# Patient Record
Sex: MALE | Race: BLACK OR AFRICAN AMERICAN | NOT HISPANIC OR LATINO | URBAN - METROPOLITAN AREA
[De-identification: names, ages, dates, MRNs, and addresses within clinical notes are randomized per-mention and may not be internally consistent; named-entity substitution may affect disease eponyms.]

---

## 2023-03-01 LAB — THYROTROPIN (MIU/L) IN SER/PLAS BY DETECTION LIMIT <= 0.05 MIU/L: 0.82 MIU/L (ref 0.44–3.98)

## 2023-03-06 LAB — HELICOBACTER PYLORI AG: POSITIVE

## 2023-03-07 ENCOUNTER — TELEPHONE (OUTPATIENT)
Dept: PRIMARY CARE | Facility: CLINIC | Age: 38
End: 2023-03-07

## 2023-03-07 NOTE — TELEPHONE ENCOUNTER
Attempted to call patient on the number to let him know about his H pylori. Left a VM. Awaitng a call back

## 2023-03-09 DIAGNOSIS — A04.8 HELICOBACTER PYLORI (H. PYLORI) INFECTION: Primary | ICD-10-CM

## 2023-03-10 DIAGNOSIS — A04.8 HELICOBACTER PYLORI (H. PYLORI) INFECTION: Primary | ICD-10-CM

## 2023-03-10 RX ORDER — OMEPRAZOLE 20 MG/1
20 CAPSULE, DELAYED RELEASE ORAL
Qty: 28 CAPSULE | Refills: 0 | Status: SHIPPED | OUTPATIENT
Start: 2023-03-10 | End: 2024-02-26 | Stop reason: WASHOUT

## 2023-03-10 RX ORDER — CLARITHROMYCIN 500 MG/1
500 TABLET, FILM COATED ORAL 2 TIMES DAILY
Qty: 28 TABLET | Refills: 0 | Status: SHIPPED | OUTPATIENT
Start: 2023-03-10 | End: 2023-03-24

## 2023-03-10 RX ORDER — AMOXICILLIN 500 MG/1
1000 CAPSULE ORAL EVERY 12 HOURS SCHEDULED
Qty: 56 CAPSULE | Refills: 0 | Status: SHIPPED | OUTPATIENT
Start: 2023-03-10 | End: 2023-03-24

## 2024-01-23 ENCOUNTER — OFFICE VISIT (OUTPATIENT)
Dept: PRIMARY CARE | Facility: CLINIC | Age: 39
End: 2024-01-23
Payer: COMMERCIAL

## 2024-01-23 ENCOUNTER — APPOINTMENT (OUTPATIENT)
Dept: PRIMARY CARE | Facility: CLINIC | Age: 39
End: 2024-01-23
Payer: COMMERCIAL

## 2024-01-23 ENCOUNTER — LAB (OUTPATIENT)
Dept: LAB | Facility: LAB | Age: 39
End: 2024-01-23
Payer: COMMERCIAL

## 2024-01-23 VITALS
DIASTOLIC BLOOD PRESSURE: 86 MMHG | TEMPERATURE: 98 F | HEIGHT: 70 IN | BODY MASS INDEX: 22.82 KG/M2 | HEART RATE: 60 BPM | SYSTOLIC BLOOD PRESSURE: 129 MMHG | OXYGEN SATURATION: 98 % | RESPIRATION RATE: 16 BRPM

## 2024-01-23 DIAGNOSIS — A04.8 HELICOBACTER PYLORI STOOL TEST POSITIVE: Primary | ICD-10-CM

## 2024-01-23 DIAGNOSIS — A04.8 HELICOBACTER PYLORI STOOL TEST POSITIVE: ICD-10-CM

## 2024-01-23 DIAGNOSIS — Z57.39 OCCUPATIONAL EXPOSURE TO OTHER AIR CONTAMINANTS: ICD-10-CM

## 2024-01-23 DIAGNOSIS — L91.8 SKIN TAG: ICD-10-CM

## 2024-01-23 PROCEDURE — 99213 OFFICE O/P EST LOW 20 MIN: CPT | Performed by: STUDENT IN AN ORGANIZED HEALTH CARE EDUCATION/TRAINING PROGRAM

## 2024-01-23 PROCEDURE — 83013 H PYLORI (C-13) BREATH: CPT

## 2024-01-23 NOTE — PROGRESS NOTES
"Subjective   Patient ID: Gavino Barba is a 38 y.o. male who presents for Follow-up.  HPI  Patient is here for a follow-up.  Reports he has completed the course of pylori antibiotic pack.  Is here for test for cure.  He reports increasing chest tightness when he was at its previous job.  Now that he has been out of his job he feels much better.  But he wonders if there was any damage done to his lungs or any particles settled down in his lungs due to his previous job.  He reports one of his other coworkers also experienced similar symptoms.  Likely denies any shortness of breath or chest pain or palpitations.  Past Surgical History:   Procedure Laterality Date    OTHER SURGICAL HISTORY  06/14/2014    Open Treatment Of Fractures Of Both Radial And Ulnar Shafts      No family history on file.   Not on File       Occupation:     Review of Systems   Constitutional:  Negative for activity change and fever.   HENT:  Negative for congestion.    Respiratory:  Negative for cough, shortness of breath and wheezing.    Cardiovascular:  Negative for chest pain and leg swelling.   Gastrointestinal:  Negative for abdominal pain, constipation, nausea and vomiting.   Endocrine: Negative for cold intolerance.   Genitourinary:  Negative for dysuria, hematuria and urgency.   Neurological:  Negative for dizziness, speech difficulty, weakness and numbness.   Psychiatric/Behavioral:  Negative for self-injury and suicidal ideas.        Objective   Visit Vitals  /86   Pulse 60   Temp 36.7 °C (98 °F)   Resp 16   Ht 1.778 m (5' 10\")   SpO2 98%   BMI 22.82 kg/m²   BSA 1.89 m²      Physical Exam  Constitutional:       Appearance: Normal appearance.   HENT:      Head: Normocephalic and atraumatic.      Nose: Nose normal.      Mouth/Throat:      Mouth: Mucous membranes are moist.   Eyes:      Conjunctiva/sclera: Conjunctivae normal.      Pupils: Pupils are equal, round, and reactive to light.   Cardiovascular:      Rate and Rhythm: Normal " rate and regular rhythm.      Pulses: Normal pulses.      Heart sounds: Normal heart sounds.   Pulmonary:      Effort: Pulmonary effort is normal.      Breath sounds: Normal breath sounds. No wheezing.   Musculoskeletal:         General: Normal range of motion.      Cervical back: Neck supple.   Skin:     General: Skin is warm.   Neurological:      General: No focal deficit present.      Mental Status: He is alert and oriented to person, place, and time.   Psychiatric:         Mood and Affect: Mood normal.         Behavior: Behavior normal.         Thought Content: Thought content normal.         Judgment: Judgment normal.         Assessment/Plan   Diagnoses and all orders for this visit:  Helicobacter pylori stool test positive  No symptoms at present.  Completed course of antibiotics.  Agreeable for retesting-     H. Pylori Breath Test; Future  Occupational exposure to other air contaminants  No symptoms at present.  It is comfortable, breathing comfortably at room air saturating at 98%.  We discussed various options including testing, being seen by pulmonologist.  Referred to see pulmonology. referral placed-     Referral to Pulmonology; Future  Skin tag  -     Referral to Dermatology

## 2024-01-24 LAB — UREA BREATH TEST QL: NEGATIVE

## 2024-02-08 ENCOUNTER — APPOINTMENT (OUTPATIENT)
Dept: PRIMARY CARE | Facility: CLINIC | Age: 39
End: 2024-02-08
Payer: COMMERCIAL

## 2024-02-15 ASSESSMENT — ENCOUNTER SYMPTOMS
WHEEZING: 0
DIZZINESS: 0
COUGH: 0
DYSURIA: 0
ABDOMINAL PAIN: 0
WEAKNESS: 0
ACTIVITY CHANGE: 0
VOMITING: 0
NUMBNESS: 0
SPEECH DIFFICULTY: 0
NAUSEA: 0
HEMATURIA: 0
SHORTNESS OF BREATH: 0
FEVER: 0
CONSTIPATION: 0

## 2024-02-26 ENCOUNTER — OFFICE VISIT (OUTPATIENT)
Dept: PULMONOLOGY | Facility: HOSPITAL | Age: 39
End: 2024-02-26
Payer: COMMERCIAL

## 2024-02-26 VITALS
BODY MASS INDEX: 23.82 KG/M2 | OXYGEN SATURATION: 98 % | WEIGHT: 166 LBS | HEART RATE: 68 BPM | DIASTOLIC BLOOD PRESSURE: 89 MMHG | SYSTOLIC BLOOD PRESSURE: 136 MMHG | TEMPERATURE: 98.1 F

## 2024-02-26 DIAGNOSIS — Z57.39 OCCUPATIONAL EXPOSURE TO OTHER AIR CONTAMINANTS: Primary | ICD-10-CM

## 2024-02-26 PROCEDURE — 1036F TOBACCO NON-USER: CPT | Performed by: NURSE PRACTITIONER

## 2024-02-26 PROCEDURE — 99214 OFFICE O/P EST MOD 30 MIN: CPT | Performed by: NURSE PRACTITIONER

## 2024-02-26 PROCEDURE — 99204 OFFICE O/P NEW MOD 45 MIN: CPT | Performed by: NURSE PRACTITIONER

## 2024-02-26 SDOH — ECONOMIC STABILITY: FOOD INSECURITY: WITHIN THE PAST 12 MONTHS, YOU WORRIED THAT YOUR FOOD WOULD RUN OUT BEFORE YOU GOT MONEY TO BUY MORE.: NEVER TRUE

## 2024-02-26 SDOH — ECONOMIC STABILITY: FOOD INSECURITY: WITHIN THE PAST 12 MONTHS, THE FOOD YOU BOUGHT JUST DIDN'T LAST AND YOU DIDN'T HAVE MONEY TO GET MORE.: NEVER TRUE

## 2024-02-26 ASSESSMENT — PATIENT HEALTH QUESTIONNAIRE - PHQ9
2. FEELING DOWN, DEPRESSED OR HOPELESS: NOT AT ALL
SUM OF ALL RESPONSES TO PHQ9 QUESTIONS 1 & 2: 0
1. LITTLE INTEREST OR PLEASURE IN DOING THINGS: NOT AT ALL

## 2024-02-26 ASSESSMENT — LIFESTYLE VARIABLES: HOW OFTEN DO YOU HAVE A DRINK CONTAINING ALCOHOL: MONTHLY OR LESS

## 2024-02-26 ASSESSMENT — PAIN SCALES - GENERAL: PAINLEVEL: 0-NO PAIN

## 2024-02-26 NOTE — PATIENT INSTRUCTIONS
Occupational exposure: having chest tightness and palpitations - has resolved since he stopped working there. CXR 2/2023 - normal   - will get repeat CXR   - will get baseline PFTs     Thank you for visiting the Pulmonary clinic today!   Return to clinic -- schedule follow up with Dr. Barker [phone]  - choose option for office - after CXR and PFTs   Herlinda Cottrell CNP  My office -  [phone]- Gabrielle is my  and Thais is my nurse.   Radiology scheduling [phone]   Appointment scheduling [phone]   Pulmonary function testing - [phone]    
detailed exam

## 2024-02-26 NOTE — PROGRESS NOTES
Patient: Gavino Barba    [id number]  : 1985 -- AGE 38 y.o.    Provider: ROBIN Guzman-CNP     Location [facility]   Service Date: 2024              [facility]  New Visit Note      HISTORY OF PRESENT ILLNESS     The patient's referring provider is: Beatriz Stern*    HISTORY OF PRESENT ILLNESS   Gavino Barba is a 38 y.o. male who presents to a [facility] for an evaluation with concerns of Pt. here today for NPV. I have independently interviewed and examined the patient in the office and reviewed available records.    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 He was having chest tightness - mild, but noticeable. He states he had no SOB with this. He was having episodes of palpitations/ skipped beats. He went to ED a few times concernign for a heart attack. He states he went back and forth several times. He was told he had H pylori - treated with 2 weeks of antiboitocs and was still having issues. He ended up leaving that job - after 3-4 months after leaving that job symptoms resolved. He had spoke with his old boss who is now at his new job - concern for ? PCBs from old transformer wiring. States there was fiberglass exposure as well. He states he would have an N95, but many times it was too hot and he had difficulty wearing it. He currently denies any symptoms - cough, wheezing, MARTE, allergies, GERD, or CP. He is just concerned regarding exposures and wants to make sure there is nothing additional he should do.      Previous pulmonary history: He has no history of recurrent infections, or lung disease as a child.  He had no previous lung hx, never on oxygen or inhaler therapy.     Inhalers/nebulized medications: none     Hospitalization History: He has not been hospitalized  over the last year for breathing related problem.    Sleep history: He has been told he snores. Wife has told him. He states he does not wake up feeling rested, but works a lot and has kids.       ALLERGIES AND MEDICATIONS     ALLERGIES  No Known Allergies    MEDICATIONS  Current Outpatient Medications   Medication Sig Dispense Refill    omeprazole (PriLOSEC) 20 mg DR capsule Take 1 capsule (20 mg) by mouth in the morning and 1 capsule (20 mg) in the evening. Take before meals. Do all this for 14 days. Do not crush or chew.. 28 capsule 0    omeprazole-clarith-amoxicillin 20 mg-500 mg- 500 mg (40) combo pack Take Omeprazole 20 mg (one capsule), clarithromycin 500 mg (one tablet), and amoxicillin 1,000 mg (two capsules) administered together twice daily for 14 days orally (Patient not taking: Reported on 2/26/2024) 1 each 0     No current facility-administered medications for this visit.         PAST HISTORY     PAST MEDICAL HISTORY  - H pylori s/p antibiotics   - left arm injury with surgery x2   - two ACL surgeries     PAST SURGICAL HISTORY  Past Surgical History:   Procedure Laterality Date    OTHER SURGICAL HISTORY  06/14/2014    Open Treatment Of Fractures Of Both Radial And Ulnar Shafts       IMMUNIZATION HISTORY    There is no immunization history on file for this patient.    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     FAMILY HISTORY  Asthma - mom, uncle, aunts     RESULTS/DATA     Pulmonary Function Test Results     None on record     Chest Radiograph     CHEST 2 VIEW   Impression  1. No evidence of acute cardiopulmonary process.      Chest CT Scan     None on record        Echocardiogram     None on record       Other testing/ Labs      REVIEW OF SYSTEMS     REVIEW OF SYSTEMS  Review of Systems    Constitutional:  Negative for fatigue and fever.   HENT:  Negative for congestion, postnasal drip, rhinorrhea, sinus pressure and voice change.    Eyes:  Negative for pain and visual disturbance.   Cardiovascular:  Positive for palpitations. Negative for chest pain and leg swelling.   Gastrointestinal:  Negative for abdominal pain, diarrhea, nausea and vomiting.   Endocrine: Negative for cold intolerance and heat intolerance.   Musculoskeletal:  Negative for arthralgias, back pain, joint swelling and myalgias.   Skin:  Negative for rash.   Neurological:  Negative for dizziness, weakness, numbness and headaches.   Psychiatric/Behavioral:  Negative for agitation. The patient is not nervous/anxious.          PHYSICAL EXAM     VITAL SIGNS: /89 (BP Location: Left arm, Patient Position: Sitting)   Pulse 68   Temp 36.7 °C (98.1 °F) (Temporal)   Wt 75.3 kg (166 lb)   SpO2 98% Comment: KOFI  BMI 23.82 kg/m²      CURRENT WEIGHT: [unfilled]  BMI: [unfilled]  PREVIOUS WEIGHTS:  Wt Readings from Last 3 Encounters:   02/26/24 75.3 kg (166 lb)   03/01/23 72.1 kg (159 lb 0.4 oz)       Physical Exam  Vitals reviewed.   Constitutional:       General: He is not in acute distress.     Appearance: Normal appearance. He is not ill-appearing or toxic-appearing.   HENT:      Head: Normocephalic.      Nose: No rhinorrhea.   Cardiovascular:      Rate and Rhythm: Normal rate and regular rhythm.      Heart sounds: Normal heart sounds.   Pulmonary:      Effort: Pulmonary effort is normal. No respiratory distress.      Breath sounds: Normal breath sounds. No stridor. No wheezing, rhonchi or rales.   Abdominal:      General: Abdomen is flat.   Musculoskeletal:         General: Normal range of motion.      Right lower leg: No edema.      Left lower leg: No edema.   Skin:     General: Skin is warm and dry.      Nails: There is no clubbing.   Neurological:      General: No focal deficit present.      Mental Status: He is alert and oriented to  person, place, and time.   Psychiatric:         Mood and Affect: Mood normal.         Behavior: Behavior normal.         Judgment: Judgment normal.         ASSESSMENT/PLAN     Occupational exposure: having chest tightness and palpitations - has resolved since he stopped working there. CXR 2/2023 - normal   - will get repeat CXR   - will get baseline PFTs     Thank you for visiting the Pulmonary clinic today!   Return to clinic -- schedule follow up with Dr. Barker [phone]  - choose option for office - after CXR and PFTs   Herlinda Cottrell CNP  My office -  [phone]- Gabrielle is my  and Thais is my nurse.   Radiology scheduling [phone]   Appointment scheduling [phone]   Pulmonary function testing - [phone]

## 2024-02-28 ASSESSMENT — ENCOUNTER SYMPTOMS
FATIGUE: 0
EYE PAIN: 0
PALPITATIONS: 1
AGITATION: 0
VOMITING: 0
BACK PAIN: 0
JOINT SWELLING: 0
FEVER: 0
DIZZINESS: 0
WEAKNESS: 0
VOICE CHANGE: 0
NAUSEA: 0
SINUS PRESSURE: 0
ARTHRALGIAS: 0
HEADACHES: 0
RHINORRHEA: 0
NUMBNESS: 0
MYALGIAS: 0
ABDOMINAL PAIN: 0
DIARRHEA: 0
NERVOUS/ANXIOUS: 0

## 2024-03-12 ENCOUNTER — HOSPITAL ENCOUNTER (OUTPATIENT)
Dept: RESPIRATORY THERAPY | Facility: HOSPITAL | Age: 39
Discharge: HOME | End: 2024-03-12
Payer: COMMERCIAL

## 2024-03-12 DIAGNOSIS — Z57.39 OCCUPATIONAL EXPOSURE TO OTHER AIR CONTAMINANTS: ICD-10-CM

## 2024-03-12 PROCEDURE — 94060 EVALUATION OF WHEEZING: CPT | Performed by: INTERNAL MEDICINE

## 2024-03-12 PROCEDURE — 94726 PLETHYSMOGRAPHY LUNG VOLUMES: CPT

## 2024-03-12 PROCEDURE — 94726 PLETHYSMOGRAPHY LUNG VOLUMES: CPT | Performed by: INTERNAL MEDICINE

## 2024-03-12 PROCEDURE — 94729 DIFFUSING CAPACITY: CPT | Performed by: INTERNAL MEDICINE

## 2024-04-03 LAB
MGC ASCENT PFT - FEV1 - POST: 2.73
MGC ASCENT PFT - FEV1 - PRE: 2.61
MGC ASCENT PFT - FEV1 - PREDICTED: 4.07
MGC ASCENT PFT - FVC - POST: 4.17
MGC ASCENT PFT - FVC - PRE: 3.98
MGC ASCENT PFT - FVC - PREDICTED: 4.97